# Patient Record
Sex: MALE | Race: WHITE | Employment: OTHER | URBAN - METROPOLITAN AREA
[De-identification: names, ages, dates, MRNs, and addresses within clinical notes are randomized per-mention and may not be internally consistent; named-entity substitution may affect disease eponyms.]

---

## 2022-03-23 ENCOUNTER — HOSPITAL ENCOUNTER (OUTPATIENT)
Dept: GENERAL RADIOLOGY | Age: 52
Discharge: HOME | End: 2022-03-23

## 2022-03-23 ENCOUNTER — HOSPITAL ENCOUNTER (OUTPATIENT)
Age: 52
Discharge: HOME | End: 2022-03-23

## 2022-03-23 DIAGNOSIS — M54.2 CERVICAL PAIN: ICD-10-CM

## 2022-03-23 PROCEDURE — 72050 X-RAY EXAM NECK SPINE 4/5VWS: CPT

## 2022-03-29 ENCOUNTER — HOSPITAL ENCOUNTER (OUTPATIENT)
Dept: PHYSICAL THERAPY | Age: 52
Setting detail: THERAPIES SERIES
Discharge: HOME | End: 2022-03-29
Payer: COMMERCIAL

## 2022-03-29 PROCEDURE — 97161 PT EVAL LOW COMPLEX 20 MIN: CPT

## 2022-03-29 PROCEDURE — 97110 THERAPEUTIC EXERCISES: CPT

## 2022-03-29 ASSESSMENT — PAIN DESCRIPTION - PROGRESSION: CLINICAL_PROGRESSION: GRADUALLY WORSENING

## 2022-03-29 ASSESSMENT — PAIN DESCRIPTION - ONSET: ONSET: AWAKENED FROM SLEEP

## 2022-03-29 ASSESSMENT — PAIN DESCRIPTION - ORIENTATION: ORIENTATION: LEFT

## 2022-03-29 ASSESSMENT — PAIN - FUNCTIONAL ASSESSMENT: PAIN_FUNCTIONAL_ASSESSMENT: PREVENTS OR INTERFERES WITH MANY ACTIVE NOT PASSIVE ACTIVITIES

## 2022-03-29 ASSESSMENT — PAIN SCALES - GENERAL: PAINLEVEL_OUTOF10: 4

## 2022-03-29 ASSESSMENT — PAIN DESCRIPTION - FREQUENCY: FREQUENCY: INTERMITTENT

## 2022-03-29 ASSESSMENT — PAIN DESCRIPTION - PAIN TYPE: TYPE: CHRONIC PAIN

## 2022-03-29 ASSESSMENT — PAIN DESCRIPTION - LOCATION: LOCATION: HEAD;NECK

## 2022-03-29 NOTE — PLAN OF CARE
Outpatient Physical Therapy           [place]           [] Phone: [phone]   Fax: [phone]  [facility]           [] Phone: [phone]   Fax: [phone]     To: Referring Practitioner: Dr. Maricruz Dunbar   From: Raffy Donato, PT    Patient: Michoacano Barnhart       : 1970  Diagnosis: Diagnosis: cervical pain   Treatment Diagnosis: Treatment Diagnosis: decreased cervical mobility and strength, limited by pain   Date: 3/29/2022    Physical Therapy Certification/Re-Certification Form  Dear Dr. Maricruz Dunbar,  The following patient has been evaluated for physical therapy services and for therapy to continue, insurance requires physician review of the treatment plan initially and every 90 days. Please review the attached evaluation and/or summary of the patient's plan of care, and verify that you agree therapy should continue by signing the attached document and sending it back to our office. Assessment:  Pt is 46year old male with chronic neck pain with recent worsening and onset of radicular symptoms in the left arm. 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 Pt demo deficits this date that include reduced cervical ROM (especially rotation and side bending), hypertonicity of suboccipitals, upper trap, and mid trap, decreased scapular/deep neck flexor musculature, decreased cervical and thoracic joint mobilization, (+) cervical radiculopathy, decreased sensation along the C6 nerve root. Pt will benefit with PT services with cervical/thoracic ROM, cervical/thoracic mobilization, STM/manual, deep neck flexor/scapular strengthening, cervical mechanical traction as needed, modalities as needed to return to PLOF. Pt prior to onset of current condition had min pain, but no impaired sensation with able to complete full ADLs and work activities.  Patient received education on their current pathology and how their condition effects them with their functional activities. Patient understood discussion and questions were answered. Patient understands their activity limitations and understands rational for treatment progression. Plan of Care/Treatment to date:  [x] Therapeutic Exercise  [] Modalities:  [x] Therapeutic Activity     [] Ultrasound  [] Electrical Stimulation  [] Gait Training      [] Cervical Traction [] Lumbar Traction  [x] Neuromuscular Re-education    [x] Cold/hotpack [] Iontophoresis   [x] Instruction in HEP      [] Vasopneumatic    [] Dry Needling  [x] Manual Therapy               [] Aquatic Therapy       Other:          Frequency/Duration:  # Days per week: [x] 1 day # Weeks: [] 1 week [x] 5 weeks     [x] 2 days   [] 2 weeks [] 6 weeks     [] 3 days   [] 3 weeks [] 7 weeks     [] 4 days   [] 4 weeks [] 8 weeks         [] 9 weeks [] 10 weeks         [] 11 weeks [] 12 weeks    Rehab Potential/Progress: [] Excellent [x] Good [] Fair  [] Poor     Goals:    Patient goals : improve pain and symptoms  Short term goals  Time Frame for Short term goals: 6 weeks  Short term goal 1: Pt demo I w/ HEP and symptom management  Short term goal 2: Pt demo NDI score <20/50 to improve tolerance to ADL's  Short term goal 3: Pt demo improved cervical rotation by >5 deg ea side to increase ability to look over his shoulder while driving  Short term goal 4: Pt demo scapular strength >4/5 in 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 centralize symptoms to the neck and report average neck pain <4/10         Electronically signed by:  Toby Wray, PT, DPT, CSCS 3/29/2022, 1:16 PM        If you have any questions or concerns, please don't hesitate to call.   Thank you for your referral.      Physician Signature:________________________________Date:_________ TIME: _____  By signing above, therapists plan is approved by physician

## 2022-03-29 NOTE — PROGRESS NOTES
Physical Therapy  Initial Assessment  Date: 3/29/2022  Patient Name: Eagle Barker  MRN: [id number]  : 1970     Treatment Diagnosis: decreased cervical mobility and strength, limited by pain    Subjective   General  Chart Reviewed: Yes  Patient assessed for rehabilitation services?: Yes  Referring Practitioner: Dr. Kathy Polk  Diagnosis: cervical pain  Follows Commands: Within Functional Limits  PT Visit Information  PT Insurance Information: [employer]  Subjective  Subjective: Patient reports in the  he had difficulties turning his neck to the L side and he had physical therapy intervention with ultrasound. States ever since then this area along the L upper trap has bothered him. States he is a  and holding his camera hurts. This past Yoanna the symptoms returned and continued to worsen for him; states he has a pinched nerve and now has N/T down his LUE into digits 1-3. States he is also an advanced . He has been doing a lot of stretches with some relief. States during the day and when it is moving it is not too bad, with rest it worsens. States he is tossing and turning a lot at night. Pain Screening  Patient Currently in Pain: Yes  Pain Assessment  Pain Assessment: 0-10  Pain Level: 4  Patient's Stated Pain Goal: 1  Pain Type: Chronic pain  Pain Location: Head;Neck  Pain Orientation: Left  Pain Radiating Towards: N/T down LUE into first three digits  Pain Descriptors: Aching; Shooting;Sore;Tingling;Numbness  Pain Frequency: Intermittent  Pain Onset: Awakened from sleep  Clinical Progression: Gradually worsening  Functional Pain Assessment: Prevents or interferes with many active not passive activities  Vital Signs  Patient Currently in Pain: Yes    Vision/Hearing  Vision  Vision: Within Functional Limits  Hearing  Hearing: Within functional limits    Orientation  Orientation  Overall Orientation Status: Within Normal Limits    Social/Functional History  Social/Functional History  ADL Assistance: Independent  Homemaking Assistance: Independent  Ambulation Assistance: Independent  Transfer Assistance: Independent  Active : Yes  Mode of Transportation: Car  Occupation: Full time employment  Type of occupation: self employed  and     Objective  Observation/Palpation  Posture: Fair  Palpation: Tenderness noted along L UT/mid trap, increased hypertonicity of suboccipitals/UT/mid trap, and levator  Observation: No gait deviations noted; increased forward head posture  Body Mechanics: DIffiuclty laying supine due to increase neck pain and radicular symptoms    PROM RUE (degrees)  RUE PROM: WNL  AROM RUE (degrees)  RUE AROM : WNL  PROM LUE (degrees)  LUE PROM: WNL  AROM LUE (degrees)  LUE AROM : WNL  Spine  Cervical: Cervical ROM:Flexion: 50 deg *feels a pullExtension: 15 deg R rotation:  40 deg *increased tingling L rotation: 40 deg R side bendin degL side bendin deg  Thoracic: Unable to clear ASIS bilat  Joint Mobility  Spine: Hypomobility of cervical down/lateral glides, decreased throacic P/A mobilizations into extension and rotation    Strength RUE  Strength RUE: WFL  Comment: except reduced mid trap, low trap and rhomboid (4-/5)  Strength LUE  Strength LUE: WFL  Comment: except reduced mid trap, low trap and rhomboid (4-/5)     Additional Measures  Special Tests: Cervical radiculopathy cluster: (+) on L side; reduced symptoms with L arm placed on head  Sensation  Overall Sensation Status: Impaired  Additional Comments: N/T intermittent down LUE to digits 1-3    Assessment   Conditions Requiring Skilled Therapeutic Intervention  Body structures, Functions, Activity limitations: Decreased functional mobility ; Increased pain;Decreased sensation;Decreased ADL status; Decreased posture;Decreased ROM; Decreased strength  Pt is 46year old male with chronic neck pain with recent worsening and onset of radicular symptoms in the left arm.  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 centralize symptoms to the neck and report average neck pain <4/10  Patient Goals   Patient goals : improve pain and symptoms    Sita Weaver, PT, DPT, CSCS

## 2022-03-29 NOTE — FLOWSHEET NOTE
Outpatient Physical Therapy  [place]           [x] Phone: [phone]   Fax: [phone]  [facility]           [] Phone: [phone]   Fax: [phone]        Physical Therapy Daily Treatment Note  Date:  3/29/2022    Patient Name:  Sharon Martin    :  1970  MRN: [id number]  Restrictions/Precautions: NONE  Diagnosis:   Diagnosis: cervical pain  Date of Injury/Surgery: --  Treatment Diagnosis: Treatment Diagnosis: decreased cervical mobility and strength, limited by pain    Insurance/Certification information: PT Insurance Information: Bc 20 visits per year  Referring Physician:  Referring Practitioner: Dr. 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 centralize symptoms to the neck and report average neck pain <4/10     Summary of Evaluation:  Pt is 46year old male with chronic neck pain with recent worsening and onset of radicular symptoms in the left arm. 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  Pt demo deficits this date that include reduced cervical ROM (especially rotation and side bending), hypertonicity of suboccipitals, upper trap, and mid trap, decreased scapular/deep neck flexor musculature, decreased cervical and thoracic joint mobilization, (+) cervical radiculopathy, decreased sensation along the C6 nerve root. Pt will benefit with PT services with cervical/thoracic ROM, cervical/thoracic mobilization, STM/manual, deep neck flexor/scapular strengthening, cervical mechanical traction as needed, modalities as needed to return to PLOF. Pt prior to onset of current condition had min pain, but no impaired sensation with able to complete full ADLs and work activities. Patient received education on their current pathology and how their condition effects them with their functional activities. Patient understood discussion and questions were answered. Patient understands their activity limitations and understands rational for treatment progression. Subjective:  See monica         Any changes in Ambulatory Summary Sheet? None        Objective:  See eval   COVID screening questions were asked and patient attested that there had been no contact or symptoms        Exercises: (No more than 4 columns)   Exercise/Equipment 3/29/22 #1 Date Date           WARM UP                     TABLE      *UT table stretch      *Supine chin tuck      *Prone HABD                     STANDING                                                     PROPRIOCEPTION                                    MODALITIES                      Other Therapeutic Activities/Education:  Patient received education on their current pathology and how their condition effects them with their functional activities. Patient understood discussion and questions were answered. Patient understands their activity limitations and understands rational for treatment progression. Home Exercise Program:  HO issued, reviewed and discussed with patient. Pt agreed to comply.         Manual Treatments:  --      Modalities:  --      Communication with other providers:  monica sent 3/29/22      Assessment:  (Response towards treatment session) (Pain Rating)  Pt is 46year old male with chronic neck pain with recent worsening and onset of radicular symptoms in the left arm. 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 Pt demo deficits this date that include reduced cervical ROM (especially rotation and side bending), hypertonicity of suboccipitals, upper trap, and mid trap, decreased scapular/deep neck flexor musculature, decreased cervical and thoracic joint mobilization, (+) cervical radiculopathy, decreased sensation along the C6 nerve root. Pt will benefit with PT services with cervical/thoracic ROM, cervical/thoracic mobilization, STM/manual, deep neck flexor/scapular strengthening, cervical mechanical traction as needed, modalities as needed to return to PLOF. Pt prior to onset of current condition had min pain, but no impaired sensation with able to complete full ADLs and work activities. Patient received education on their current pathology and how their condition effects them with their functional activities. Patient understood discussion and questions were answered. Patient understands their activity limitations and understands rational for treatment progression.           Plan for Next Session:  --      Time In / Time Out:    0142-8249      Timed Code/Total Treatment Minutes:  48'  (1) PT eval   (1) TE 10'      Next Progress Note due:  10th visit      Plan of Care Interventions:  [x] Therapeutic Exercise  [] Modalities:  [x] Therapeutic Activity     [] Ultrasound  [] Estim  [] Gait Training      [] Cervical Traction [] Lumbar Traction  [x] Neuromuscular Re-education    [] Cold/hotpack [] Iontophoresis   [x] Instruction in HEP      [] Vasopneumatic   [] Dry Needling    [x] Manual Therapy               [] Aquatic Therapy              Electronically signed by:  Soo Sarmiento, PT, DPT, CSCS 3/29/2022, 7:16 AM

## 2022-04-01 ENCOUNTER — HOSPITAL ENCOUNTER (OUTPATIENT)
Dept: PHYSICAL THERAPY | Age: 52
Setting detail: THERAPIES SERIES
Discharge: HOME | End: 2022-04-01
Payer: COMMERCIAL

## 2022-04-01 PROCEDURE — 97110 THERAPEUTIC EXERCISES: CPT

## 2022-04-01 PROCEDURE — 97140 MANUAL THERAPY 1/> REGIONS: CPT

## 2022-04-01 PROCEDURE — G0283 ELEC STIM OTHER THAN WOUND: HCPCS

## 2022-04-01 NOTE — FLOWSHEET NOTE
Outpatient Physical Therapy  [place]           [x] Phone: [phone]   Fax: [phone]  [facility]           [] Phone: [phone]   Fax: [phone]        Physical Therapy Daily Treatment Note  Date:  2022    Patient Name:  Aleta Jean Baptiste    :  1970  MRN: [id number]  Restrictions/Precautions: NONE  Diagnosis:   Diagnosis: cervical pain  Date of Injury/Surgery: --  Treatment Diagnosis: Treatment Diagnosis: decreased cervical mobility and strength, limited by pain    Insurance/Certification information: PT Insurance Information: [employer] 20 visits per year  Referring Physician:  Referring Practitioner: Dr. 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 centralize symptoms to the neck and report average neck pain <4/10     Summary of Evaluation:  Pt is 46year old male with chronic neck pain with recent worsening and onset of radicular symptoms in the left arm. 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  Pt demo deficits this date that include reduced cervical ROM (especially rotation and side bending), hypertonicity of suboccipitals, upper trap, and mid trap, decreased scapular/deep neck flexor musculature, decreased cervical and thoracic joint mobilization, (+) cervical radiculopathy, decreased sensation along the C6 nerve root. Pt will benefit with PT services with cervical/thoracic ROM, cervical/thoracic mobilization, STM/manual, deep neck flexor/scapular strengthening, cervical mechanical traction as needed, modalities as needed to return to PLOF. Pt prior to onset of current condition had min pain, but no impaired sensation with able to complete full ADLs and work activities. Patient received education on their current pathology and how their condition effects them with their functional activities. Patient understood discussion and questions were answered. Patient understands their activity limitations and understands rational for treatment progression. Subjective:  Pt stated that his pain was about 3-4/10 with radicular symptoms into upper shoulder. Pt stated that he has difficulty laying flat. Pt sated that he gets increased pain with shoulders back. Pt stated that he does better with his shoulders supported and with his arm OH        Any changes in Ambulatory Summary Sheet?   None        Objective:     COVID screening questions were asked and patient attested that there had been no contact or symptoms    Attempted  Manual in supine with towels under shoulders not tolerated   Attempted seated manual still not tolerable  Trial of E-stim as per George Guerrero PT  Seated      Exercises: (No more than 4 columns)   Exercise/Equipment 3/29/22 #1 4/1/2022 #2 Date           WARM UP         UBE   4' 120 backwards          TABLE      *UT table stretch  Man +pain     Man after e-stim    *Supine chin tuck  Man supine and seated  +pain    *Prone HABD      Seated hands behind head with shoulder squeezes  10x2              STANDING                                                     PROPRIOCEPTION                                    MODALITIES      IFC  W/ HP  15'   Vector 60°               Other Therapeutic Activities/Education:  Patient received education on their current pathology and how their condition effects them with their functional activities. Patient understood discussion and questions were answered. Patient understands their activity limitations and understands rational for treatment progression. Home Exercise Program:  HO issued, reviewed and discussed with patient. Pt agreed to comply. Manual Treatments:  --      Modalities:IFC as per Bailee Lunsford, PT  Seated with HP x 15' vector 60     Communication with other providers:  eval sent 3/29/22      Assessment:  (Response towards treatment session) (Pain Rating)Pt tolerated treatment fair. Prior to e-stim, patient was not able to even tolerate the manual treatment in supine or sitting. After e-stim, patient was able to tolerate more exercise and more manual. Pt rated pain at 2/10 after treatment and had a little more tissue mobility. Pt is 46year old male with chronic neck pain with recent worsening and onset of radicular symptoms in the left arm. 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 Pt demo deficits this date that include reduced cervical ROM (especially rotation and side bending), hypertonicity of suboccipitals, upper trap, and mid trap, decreased scapular/deep neck flexor musculature, decreased cervical and thoracic joint mobilization, (+) cervical radiculopathy, decreased sensation along the C6 nerve root. Pt will benefit with PT services with cervical/thoracic ROM, cervical/thoracic mobilization, STM/manual, deep neck flexor/scapular strengthening, cervical mechanical traction as needed, modalities as needed to return to PLOF. Pt prior to onset of current condition had min pain, but no impaired sensation with able to complete full ADLs and work activities. Patient received education on their current pathology and how their condition effects them with their functional activities.  Patient understood discussion and questions were answered. Patient understands their activity limitations and understands rational for treatment progression.           Plan for Next Session:  --      Time In / Time Out:  1003/1057      Timed Code/Total Treatment Minutes:  39'/54 2 man(28') 1 TE (12')  1 estim     Next Progress Note due:  10th visit      Plan of Care Interventions:  [x] Therapeutic Exercise  [] Modalities:  [x] Therapeutic Activity     [] Ultrasound  [] Estim  [] Gait Training      [] Cervical Traction [] Lumbar Traction  [x] Neuromuscular Re-education    [] Cold/hotpack [] Iontophoresis   [x] Instruction in HEP      [] Vasopneumatic   [] Dry Needling    [x] Manual Therapy               [] Aquatic Therapy              Electronically signed by: Geno Souza PTA  04/01/22 4/1/2022,2:59 PM

## 2022-04-04 ENCOUNTER — HOSPITAL ENCOUNTER (OUTPATIENT)
Dept: PHYSICAL THERAPY | Age: 52
Setting detail: THERAPIES SERIES
Discharge: HOME | End: 2022-04-04
Payer: COMMERCIAL

## 2022-04-04 PROCEDURE — 97110 THERAPEUTIC EXERCISES: CPT

## 2022-04-04 PROCEDURE — 97140 MANUAL THERAPY 1/> REGIONS: CPT

## 2022-04-04 NOTE — FLOWSHEET NOTE
Outpatient Physical Therapy  [place]           [x] Phone: [phone]   Fax: [phone]  [facility]           [] Phone: [phone]   Fax: [phone]        Physical Therapy Daily Treatment Note  Date:  2022    Patient Name:  Joseph Mckoy    :  1970  MRN: [id number]  Restrictions/Precautions: NONE  Diagnosis:   Diagnosis: cervical pain  Date of Injury/Surgery: --  Treatment Diagnosis: Treatment Diagnosis: decreased cervical mobility and strength, limited by pain    Insurance/Certification information: PT Insurance Information: [employer] 20 visits per year  Referring Physician:  Referring Practitioner: Dr. Emily Sánchez  Next Doctor Visit:  --  Plan of care signed (Y/N):  YES, sent 3/29/22  Outcome Measure: NDI:   Visit# / total visits:   3/10  Pain level: 3-4/10   Goals:     Patient goals : improve pain and symptoms  Short term goals  Time Frame for Short term goals: 6 weeks  Short term goal 1: Pt demo I w/ HEP and symptom management  Short term goal 2: Pt demo NDI score <20/50 to improve tolerance to ADL's  Short term goal 3: Pt demo improved cervical rotation by >5 deg ea side to increase ability to look over his shoulder while driving  Short term goal 4: Pt demo scapular strength >4/5 in 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 centralize symptoms to the neck and report average neck pain <4/10     Summary of Evaluation:  Pt is 46year old male with chronic neck pain with recent worsening and onset of radicular symptoms in the left arm. 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  Pt demo deficits this date that include reduced cervical ROM (especially rotation and side bending), hypertonicity of suboccipitals, upper trap, and mid trap, decreased scapular/deep neck flexor musculature, decreased cervical and thoracic joint mobilization, (+) cervical radiculopathy, decreased sensation along the C6 nerve root. Pt will benefit with PT services with cervical/thoracic ROM, cervical/thoracic mobilization, STM/manual, deep neck flexor/scapular strengthening, cervical mechanical traction as needed, modalities as needed to return to PLOF. Pt prior to onset of current condition had min pain, but no impaired sensation with able to complete full ADLs and work activities. Patient received education on their current pathology and how their condition effects them with their functional activities. Patient understood discussion and questions were answered. Patient understands their activity limitations and understands rational for treatment progression. Subjective:  Piyush Kebede reports his symptoms have been worse over the last week and he is still unable to sleep. States the exercises have been good, but only allow for temporary relief. He called his doctor to get MRI set-up. Any changes in Ambulatory Summary Sheet?   None      Objective:     COVID screening questions were asked and patient attested that there had been no contact or symptoms    Cervical side bending R: 44 deg    Exercises: (No more than 4 columns)   Exercise/Equipment 3/29/22 #1 4/1/2022 #2 4/4/22 #3           WARM UP         UBE   4' 120 backwards --         TABLE      *UT table stretch  Man +pain     Man after e-stim MAN, reviewed to ensure proper technique   *Supine chin tuck  Man supine and seated  +pain Unable to tolerate today   *Prone HABD   Standing modified 2x10   Seated hands behind head with shoulder squeezes  10x2  -   TB Row   x10 RTB                     STANDING      Pectoralis stretch   20\" x2 at 90 deg abd   Modified prone row    2x10 no weight   TRX Walk-Outs   2x10 5\"                                PROPRIOCEPTION                                    MODALITIES      IFC  W/ HP  15'   Vector 60°  --             Other Therapeutic Activities/Education:  Patient received education on their current pathology and how their condition effects them with their functional activities. Patient understood discussion and questions were answered. Patient understands their activity limitations and understands rational for treatment progression. Home Exercise Program:  HO issued, reviewed and discussed with patient. Pt agreed to comply. Manual Treatments:  , thoracic P/A mobilizations into extension, IASTM of suboccipitals, UT/levator x15'      Modalities:    Communication with other providers:  eval sent 3/29/22      Assessment:  Maria Del Carmen Hayden demonstrates fair tolerance to today's session. He has increased pain and N/T upon arrival, able to tolerate seated soft tissue except with increased tingling down the arm with pressure to levator. Unable to tolerate prone/supine today due to increase N/T. Symptoms improve with overhead positioning of arm, external rotation, and extension; worsens with closing down at the [facility] joint. Plans to have MRI completed. Told pt to hold on chin tucks due to increased N/T. Progress scapular strengthening and soft tissue mobilization within patient tolerance. End of session: 3/10      Pt is 46year old male with chronic neck pain with recent worsening and onset of radicular symptoms in the left arm. 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 Pt demo deficits this date that include reduced cervical ROM (especially rotation and side bending), hypertonicity of suboccipitals, upper trap, and mid trap, decreased scapular/deep neck flexor musculature, decreased cervical and thoracic joint mobilization, (+) cervical radiculopathy, decreased sensation along the C6 nerve root. Pt will benefit with PT services with cervical/thoracic ROM, cervical/thoracic mobilization, STM/manual, deep neck flexor/scapular strengthening, cervical mechanical traction as needed, modalities as needed to return to PLOF.  Pt prior to onset of current condition had min pain, but no impaired sensation with able to complete full ADLs and work activities. Patient received education on their current pathology and how their condition effects them with their functional activities. Patient understood discussion and questions were answered. Patient understands their activity limitations and understands rational for treatment progression.           Plan for Next Session:  --      Time In / Time Out:  1863-6839      Timed Code/Total Treatment Minutes:  36'    (1) MAN 15'   (2)  TE 25'    Next Progress Note due:  10th visit      Plan of Care Interventions:  [x] Therapeutic Exercise  [x] Modalities:  [x] Therapeutic Activity     [] Ultrasound  [x] Estim  [] Gait Training      [] Cervical Traction [] Lumbar Traction  [x] Neuromuscular Re-education    [] Cold/hotpack [] Iontophoresis   [x] Instruction in HEP      [] Vasopneumatic   [] Dry Needling    [x] Manual Therapy               [] Aquatic Therapy              Electronically signed by: Rylee Pizano, PT, DPT, CSCS  04/04/22 4/4/2022,8:43 AM

## 2022-04-08 ENCOUNTER — HOSPITAL ENCOUNTER (OUTPATIENT)
Dept: PHYSICAL THERAPY | Age: 52
Setting detail: THERAPIES SERIES
Discharge: HOME | End: 2022-04-08
Payer: COMMERCIAL

## 2022-04-08 PROCEDURE — 97110 THERAPEUTIC EXERCISES: CPT

## 2022-04-08 PROCEDURE — 97140 MANUAL THERAPY 1/> REGIONS: CPT

## 2022-04-08 NOTE — FLOWSHEET NOTE
Outpatient Physical Therapy  [place]           [x] Phone: [phone]   Fax: [phone]  [facility]           [] Phone: [phone]   Fax: [phone]        Physical Therapy Daily Treatment Note  Date:  2022    Patient Name:  Ebenezer Beckman    :  1970  MRN: [id number]  Restrictions/Precautions: NONE  Diagnosis:   Diagnosis: cervical pain  Date of Injury/Surgery: --  Treatment Diagnosis: Treatment Diagnosis: decreased cervical mobility and strength, limited by pain    Insurance/Certification information: PT Insurance Information: [employer] 20 visits per year  Referring Physician:  Referring Practitioner: Dr. 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 centralize symptoms to the neck and report average neck pain <4/10     Summary of Evaluation:  Pt is 46year old male with chronic neck pain with recent worsening and onset of radicular symptoms in the left arm. 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  Pt demo deficits this date that include reduced cervical ROM (especially rotation and side bending), hypertonicity of suboccipitals, upper trap, and mid trap, decreased scapular/deep neck flexor musculature, decreased cervical and thoracic joint mobilization, (+) cervical radiculopathy, decreased sensation along the C6 nerve root. Pt will benefit with PT services with cervical/thoracic ROM, cervical/thoracic mobilization, STM/manual, deep neck flexor/scapular strengthening, cervical mechanical traction as needed, modalities as needed to return to PLOF. Pt prior to onset of current condition had min pain, but no impaired sensation with able to complete full ADLs and work activities. Patient received education on their current pathology and how their condition effects them with their functional activities. Patient understood discussion and questions were answered. Patient understands their activity limitations and understands rational for treatment progression. Subjective:  Pamela Bey arrives to therapy stating that he is not feeling so good. Did not go to sleep until 4a. m. once the exhaustion was worse than his pain. He reports he woke up with a very stiff neck. He has an MRI scheduled for Monday the 11th. He states that he experiences increase in pain and N/T if he has to drive more than 30' at a time. Beginning session pain 4/10. During treatment pain 4/10. Any changes in Ambulatory Summary Sheet? None    Objective:     COVID screening questions were asked and patient attested that there had been no contact or symptoms  Reports of tingling during chin tucks and UT stretching.      Exercises: (No more than 4 columns)   Exercise/Equipment 3/29/22 #1 4/1/2022 #2 4/4/22 #3 4/8/22 #4            WARM UP          UBE   4' 120 backwards -- 4' 120 backwards          TABLE       *UT table stretch  Man +pain     Man after e-stim MAN, reviewed to ensure proper technique Man + pain/tingling    *Supine chin tuck  Man supine and seated  +pain Unable to tolerate today x10 seated    *Prone HABD   Standing modified 2x10 2x10 prone    Seated hands behind head with shoulder squeezes  10x2  - 2x10 3\"   TB Row   x10 RTB x10 RTB                        STANDING       Pectoralis stretch 20\" x2 at 90 deg abd    Modified prone row    2x10 no weight 2x10 no weight   TRX Walk-Outs   2x10 5\" 2x10 5\"                                    PROPRIOCEPTION                                          MODALITIES       IFC  W/ HP  15'   Vector 60°  --               Other Therapeutic Activities/Education:  Patient received education on their current pathology and how their condition effects them with their functional activities. Patient understood discussion and questions were answered. Patient understands their activity limitations and understands rational for treatment progression. Home Exercise Program:  HO issued, reviewed and discussed with patient. Pt agreed to comply. Manual Treatments:  , upper trap stretch, suboccipital release, min distraction      Modalities:    Communication with other providers:  eval sent 3/29/22      Assessment:  Preet tolerated today's session fair with no increase in pain, but did have some increase in  N/T which he says is nothing new and that he was experiencing this before tx.      End session pain 4/10    Plan for Next Session:  --      Time In / Time Out: 1123/1154      Timed Code/Total Treatment Minutes:  1 TE 21 , 1 Man 10'    Next Progress Note due:  10th visit      Plan of Care Interventions:  [x] Therapeutic Exercise  [x] Modalities:  [x] Therapeutic Activity     [] Ultrasound  [x] Estim  [] Gait Training      [] Cervical Traction [] Lumbar Traction  [x] Neuromuscular Re-education    [] Cold/hotpack [] Iontophoresis   [x] Instruction in HEP      [] Vasopneumatic   [] Dry Needling    [x] Manual Therapy               [] Aquatic Therapy              Electronically signed by: Licia Pastor, Scot Saint, PTA   04/08/22 4/8/2022,6:57 AM

## 2022-04-11 ENCOUNTER — HOSPITAL ENCOUNTER (OUTPATIENT)
Dept: PHYSICAL THERAPY | Age: 52
Setting detail: THERAPIES SERIES
Discharge: HOME | End: 2022-04-11
Payer: COMMERCIAL

## 2022-04-11 PROCEDURE — 97140 MANUAL THERAPY 1/> REGIONS: CPT

## 2022-04-11 PROCEDURE — 97110 THERAPEUTIC EXERCISES: CPT

## 2022-04-11 NOTE — FLOWSHEET NOTE
Outpatient Physical Therapy  [place]           [x] Phone: [phone]   Fax: [phone]  [facility]           [] Phone: [phone]   Fax: [phone]        Physical Therapy Daily Treatment Note  Date:  2022    Patient Name:  Edgardo López    :  1970  MRN: [id number]  Restrictions/Precautions: NONE  Diagnosis:   Diagnosis: cervical pain  Date of Injury/Surgery: --  Treatment Diagnosis: Treatment Diagnosis: decreased cervical mobility and strength, limited by pain    Insurance/Certification information: PT Insurance Information: [employer] 20 visits per year  Referring Physician:  Referring Practitioner: Dr. 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 centralize symptoms to the neck and report average neck pain <4/10     Summary of Evaluation:  Pt is 46year old male with chronic neck pain with recent worsening and onset of radicular symptoms in the left arm. 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  Pt demo deficits this date that include reduced cervical ROM (especially rotation and side bending), hypertonicity of suboccipitals, upper trap, and mid trap, decreased scapular/deep neck flexor musculature, decreased cervical and thoracic joint mobilization, (+) cervical radiculopathy, decreased sensation along the C6 nerve root. Pt will benefit with PT services with cervical/thoracic ROM, cervical/thoracic mobilization, STM/manual, deep neck flexor/scapular strengthening, cervical mechanical traction as needed, modalities as needed to return to PLOF. Pt prior to onset of current condition had min pain, but no impaired sensation with able to complete full ADLs and work activities. Patient received education on their current pathology and how their condition effects them with their functional activities. Patient understood discussion and questions were answered. Patient understands their activity limitations and understands rational for treatment progression. Subjective:  Michelle Barahona arrives to therapy reporting 4/10     Any changes in Ambulatory Summary Sheet? None    Objective:     COVID screening questions were asked and patient attested that there had been no contact or symptoms    Reports of tingling during chin tucks and UT stretching.      Exercises: (No more than 4 columns)   Exercise/Equipment 4/4/22 #3 4/8/22 #4 4/11/22 #5           WARM UP         UBE  -- 4' 120 backwards 4'         TABLE      *UT table stretch MAN, reviewed to ensure proper technique Man + pain/tingling     *Supine chin tuck Unable to tolerate today x10 seated     *Prone HABD Standing modified 2x10 2x10 prone     Seated hands behind head with shoulder squeezes - 2x10 3\" 2x10 3\"   TB Row x10 RTB x10 RTB 2x10 RTB                     STANDING      Pectoralis stretch 20\" x2 at 90 deg abd  30\" x2 at 90 deg   Modified prone row  2x10 no weight 2x10 no weight 2x10 no weight   TRX Walk-Outs 2x10 5\" 2x10 5\" 2x10 5-10\"   Wall Dog   5\" x4 throughout session                          PROPRIOCEPTION                                    MODALITIES      IFC  W/ HP --               Other Therapeutic Activities/Education:  Patient received education on their current pathology and how their condition effects them with their functional activities. Patient understood discussion and questions were answered. Patient understands their activity limitations and understands rational for treatment progression. Home Exercise Program:  HO issued, reviewed and discussed with patient. Pt agreed to comply. Manual Treatments:  , upper trap stretch, suboccipital release, min distractionIASTM of suboccipitals, UT/levator x15'      Modalities:    Communication with other providers:  eval sent 3/29/22      Assessment:  Preet tolerated today's session fair with no increase in pain. Continued with IASTM since he was able to get more tissue mobility following. He is able to toelrate all his new exercises from previous sessions, but still has difficulties lying prone/supine.       End session pain 4/10    Plan for Next Session:  --      Time In / Time Out: 0716-8936      Timed Code/Total Treatment Minutes:  2 TE 35, 1 Man 10'    Next Progress Note due:  10th visit      Plan of Care Interventions:  [x] Therapeutic Exercise  [x] Modalities:  [x] Therapeutic Activity     [] Ultrasound  [x] Estim  [] Gait Training      [] Cervical Traction [] Lumbar Traction  [x] Neuromuscular Re-education    [] Cold/hotpack [] Iontophoresis   [x] Instruction in HEP      [] Vasopneumatic   [] Dry Needling    [x] Manual Therapy               [] Aquatic Therapy              Electronically signed by: Annette Ballard, PT, DPT, CSCS  04/11/22 4/11/2022,8:39 AM

## 2022-04-14 ENCOUNTER — HOSPITAL ENCOUNTER (OUTPATIENT)
Dept: PHYSICAL THERAPY | Age: 52
Setting detail: THERAPIES SERIES
Discharge: HOME | End: 2022-04-14
Payer: COMMERCIAL

## 2022-04-14 PROCEDURE — 97012 MECHANICAL TRACTION THERAPY: CPT

## 2022-04-14 PROCEDURE — 97140 MANUAL THERAPY 1/> REGIONS: CPT

## 2022-04-14 NOTE — FLOWSHEET NOTE
(+) cervical radiculopathy, decreased sensation along the C6 nerve root. Pt will benefit with PT services with cervical/thoracic ROM, cervical/thoracic mobilization, STM/manual, deep neck flexor/scapular strengthening, cervical mechanical traction as needed, modalities as needed to return to PLOF. Pt prior to onset of current condition had min pain, but no impaired sensation with able to complete full ADLs and work activities. Patient received education on their current pathology and how their condition effects them with their functional activities. Patient understood discussion and questions were answered. Patient understands their activity limitations and understands rational for treatment progression. Subjective: Tyrell Ortez arrives to therapy stating that he does not feel great today. Pt reports pain is significant today with tingling down the arms. MRI revealed protruding disc between C5, C6. Pain beginning session is 6/10  Pain during traction 3/10    Any changes in Ambulatory Summary Sheet?      Objective:     COVID screening questions were asked and patient attested that there had been no contact or symptoms  responded well to transverse mobs    Exercises: (No more than 4 columns)   Exercise/Equipment 4/8/22 #4 4/11/22 #5 4/14/22 #6           WARM UP         UBE  4' 120 backwards 4' 4' 120         TABLE      *UT table stretch Man + pain/tingling      *Supine chin tuck x10 seated      *Prone HABD 2x10 prone      Seated hands behind head with shoulder squeezes 2x10 3\" 2x10 3\"    TB Row x10 RTB 2x10 RTB                      STANDING      Pectoralis stretch  30\" x2 at 90 deg    Modified prone row  2x10 no weight 2x10 no weight    TRX Walk-Outs 2x10 5\" 2x10 5-10\"    Wall Dog  5\" x4 throughout session                           PROPRIOCEPTION                                    MODALITIES      IFC  W/ HP                Other Therapeutic Activities/Education:  Patient received education on their current pathology and how their condition effects them with their functional activities. Patient understood discussion and questions were answered. Patient understands their activity limitations and understands rational for treatment progression. Home Exercise Program:  HO issued, reviewed and discussed with patient. Pt agreed to comply. Manual Treatments: manual cervical traction, thoracic PA mobs, rotational mobs     Modalities:  Mechanical cervical traction 20# max pull10# min pull for 10'      Communication with other providers:  eval sent 3/29/22      Assessment:  Preet tolerated today's session well. He responded well to manual and mechanical traction with elimination of tingling sensation and reported decrease in pain.      End session pain 3/10        Plan for Next Session:  --      Time In / Time Out: 1118/1200      Timed Code/Total Treatment Minutes: 43'  2 MAN 27', 1 mechanical traction 15'    Next Progress Note due:  10th visit      Plan of Care Interventions:  [x] Therapeutic Exercise  [x] Modalities:  [x] Therapeutic Activity     [] Ultrasound  [x] Estim  [] Gait Training      [] Cervical Traction [] Lumbar Traction  [x] Neuromuscular Re-education    [] Cold/hotpack [] Iontophoresis   [x] Instruction in HEP      [] Vasopneumatic   [] Dry Needling    [x] Manual Therapy               [] Aquatic Therapy              Electronically signed by: Henrique Bowling, PTA     4/14/2022,9:19 AM

## 2022-04-18 ENCOUNTER — HOSPITAL ENCOUNTER (OUTPATIENT)
Dept: PHYSICAL THERAPY | Age: 52
Setting detail: THERAPIES SERIES
Discharge: HOME | End: 2022-04-18
Payer: COMMERCIAL

## 2022-04-18 PROCEDURE — 97110 THERAPEUTIC EXERCISES: CPT

## 2022-04-18 PROCEDURE — 97012 MECHANICAL TRACTION THERAPY: CPT

## 2022-04-18 NOTE — FLOWSHEET NOTE
Outpatient Physical Therapy  [place]           [x] Phone: [phone]   Fax: [phone]  [facility]           [] Phone: [phone]   Fax: [phone]        Physical Therapy Daily Treatment Note  Date:  2022    Patient Name:  Andres Nascimento    :  1970  MRN: [id number]  Restrictions/Precautions: NONE  Diagnosis:   Diagnosis: cervical pain  Date of Injury/Surgery: --  Treatment Diagnosis: Treatment Diagnosis: decreased cervical mobility and strength, limited by pain    Insurance/Certification information: PT Insurance Information: [employer] 20 visits per year  Referring Physician:  Referring Practitioner: Dr. Yifan Conn  Next Doctor Visit:  --  Plan of care signed (Y/N):  YES, sent 3/29/22  Outcome Measure: NDI:   Visit# / total visits:   7/10  Pain level: 3-4/10   Goals:     Patient goals : improve pain and symptoms  Short term goals  Time Frame for Short term goals: 6 weeks  Short term goal 1: Pt demo I w/ HEP and symptom management reports compliance  Short term goal 2: Pt demo NDI score <20/50 to improve tolerance to ADL's  Short term goal 3: Pt demo improved cervical rotation by >5 deg ea side to increase ability to look over his shoulder while driving  Short term goal 4: Pt demo scapular strength >4/5 in 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 centralize symptoms to the neck and report average neck pain <4/10     Summary of Evaluation:  Pt is 46year old male with chronic neck pain with recent worsening and onset of radicular symptoms in the left arm. 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  Pt demo deficits this date that include reduced cervical ROM (especially rotation and side bending), hypertonicity of suboccipitals, upper trap, and mid trap, decreased scapular/deep neck flexor musculature, decreased cervical and thoracic joint mobilization, (+) cervical radiculopathy, decreased sensation along the C6 nerve root. Pt will benefit with PT services with cervical/thoracic ROM, cervical/thoracic mobilization, STM/manual, deep neck flexor/scapular strengthening, cervical mechanical traction as needed, modalities as needed to return to PLOF. Pt prior to onset of current condition had min pain, but no impaired sensation with able to complete full ADLs and work activities. Patient received education on their current pathology and how their condition effects them with their functional activities. Patient understood discussion and questions were answered. Patient understands their activity limitations and understands rational for treatment progression. Subjective: Maria Del Carmen Hayden arrives to therapy reporting 6/10 in the neck and tingling down his arm to his thumb. Has not spoken to the doctor yet regarding his MRI results. May 6th neurosurgeon appointment in [place]. States his sleep continues to be awful. Any changes in Ambulatory Summary Sheet?      Objective:   COVID screening questions were asked and patient attested that there had been no contact or symptoms      Exercises: (No more than 4 columns)   Exercise/Equipment 4/8/22 #4 4/11/22 #5 4/14/22 #6 4/18/22 #7            WARM UP          UBE  4' 120 backwards 4' 4' 120 4' 120          TABLE       *UT table stretch Man + pain/tingling       *Supine chin tuck x10 seated       *Prone HABD 2x10 prone       Seated hands behind head with shoulder squeezes 2x10 3\" 2x10 3\"     TB Row x10 RTB 2x10 RTB                          STANDING       Pectoralis stretch  30\" x2 at 90 deg  30\" x2   Modified prone row  2x10 no weight 2x10 no weight  x10   TRX Walk-Outs 2x10 5\" 2x10 5-10\"  2x10   Wall Dog  5\" x4 throughout session  x10 5\"                              PROPRIOCEPTION                                          MODALITIES       IFC  W/ HP                  Other Therapeutic Activities/Education: Patient received education on their current pathology and how their condition effects them with their functional activities. Patient understood discussion and questions were answered. Patient understands their activity limitations and understands rational for treatment progression. Home Exercise Program:  HO issued, reviewed and discussed with patient. Pt agreed to comply. Manual Treatments:     Modalities:  Mechanical cervical traction 20# max pull10# min pull for 15'      Communication with other providers:  dianaal sent 3/29/22      Assessment:  Preet tolerated today's session fair. He continues to have increased pain and radicular pain. He continues to get good relief with traction from his radicular symptoms, but symptoms do not relieve for long periods of time.      End session pain 3/10        Plan for Next Session:  --      Time In / Time Out: 0617-3735      Timed Code/Total Treatment Minutes:   39'   (2)  TE  25'  (1) traction 15' + 5' set-up    Next Progress Note due:  10th visit      Plan of Care Interventions:  [x] Therapeutic Exercise  [x] Modalities:  [x] Therapeutic Activity     [] Ultrasound  [x] Estim  [] Gait Training      [] Cervical Traction [] Lumbar Traction  [x] Neuromuscular Re-education    [] Cold/hotpack [] Iontophoresis   [x] Instruction in HEP      [] Vasopneumatic   [] Dry Needling    [x] Manual Therapy               [] Aquatic Therapy              Electronically signed by: Monica Gilliland, PT, DPT, CSCS      4/18/2022,8:37 AM

## 2022-05-09 ENCOUNTER — HOSPITAL ENCOUNTER (OUTPATIENT)
Dept: PHYSICAL THERAPY | Age: 52
Setting detail: THERAPIES SERIES
Discharge: HOME | End: 2022-05-09
Payer: COMMERCIAL

## 2022-05-09 PROCEDURE — 97110 THERAPEUTIC EXERCISES: CPT

## 2022-05-09 NOTE — FLOWSHEET NOTE
Outpatient Physical Therapy  [place]           [x] Phone: [phone]   Fax: [phone]  [facility]           [] Phone: [phone]   Fax: [phone]        Physical Therapy Daily Treatment Note  Date:  2022    Patient Name:  Anne Hemphill    :  1970  MRN: [id number]  Restrictions/Precautions: NONE  Diagnosis:   Diagnosis: cervical pain  Date of Injury/Surgery: --  Treatment Diagnosis: Treatment Diagnosis: decreased cervical mobility and strength, limited by pain    Insurance/Certification information: PT Insurance Information: [employer] 20 visits per year  Referring Physician:  Referring Practitioner: Dr. Fatimah Vargas  Next Doctor Visit:  --  Plan of care signed (Y/N):  YES, sent 3/29/22  Outcome Measure: NDI:   Visit# / total visits:   8/10  Pain level: 2/10   Goals:     Patient goals : improve pain and symptoms  Short term goals  Time Frame for Short term goals: 6 weeks  Short term goal 1: Pt demo I w/ HEP and symptom management reports compliance  Short term goal 2: Pt demo NDI score <20/50 to improve tolerance to ADL's  Short term goal 3: Pt demo improved cervical rotation by >5 deg ea side to increase ability to look over his shoulder while driving  Short term goal 4: Pt demo scapular strength >4/5 in 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 centralize symptoms to the neck and report average neck pain <4/10     Summary of Evaluation:  Pt is 46year old male with chronic neck pain with recent worsening and onset of radicular symptoms in the left arm. 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  Pt demo deficits this date that include reduced cervical ROM (especially rotation and side bending), hypertonicity of suboccipitals, upper trap, and mid trap, decreased scapular/deep neck flexor musculature, decreased cervical and thoracic joint mobilization, (+) cervical radiculopathy, decreased sensation along the C6 nerve root. Pt will benefit with PT services with cervical/thoracic ROM, cervical/thoracic mobilization, STM/manual, deep neck flexor/scapular strengthening, cervical mechanical traction as needed, modalities as needed to return to PLOF. Pt prior to onset of current condition had min pain, but no impaired sensation with able to complete full ADLs and work activities. Patient received education on their current pathology and how their condition effects them with their functional activities. Patient understood discussion and questions were answered. Patient understands their activity limitations and understands rational for treatment progression. Subjective: [facility] reports he saw the neurosurgeon who says there is 3-4 mm disc protrusion at C5/6. Surgeon told him to wait if he can approx. 2-3 months if no improvement. Will try some pain medication and he signed up for a pain management clinic. States the pain is significantly better, nerve pain is always there but tolerable. Rates 2/10 down to his elbow. He got a couple of self cervical traction with some relief. Any changes in Ambulatory Summary Sheet?      Objective:   COVID screening questions were asked and patient attested that there had been no contact or symptoms      Exercises: (No more than 4 columns)   Exercise/Equipment 4/14/22 #6 4/18/22 #7 5/9/22 #8           WARM UP         UBE  4' 120 4' 120 4' 120         TABLE      *UT table stretch      *Supine chin tuck      *Prone HABD/Ext   2x10 2#   Seated hands behind head with shoulder squeezes      TB Row   2x10 RTB         STANDING      Pectoralis stretch  30\" x2 30\" x2 at doorway   Modified prone row   x10 --   TRX Walk-Outs  2x10    Wall Dog  x10 5\"     Bent Over Row   2x10 5#   TB Shoulder Ext   2x10 RTB   FM SA Row    2x10 10#   PROPRIOCEPTION                                    MODALITIES      IFC  W/ HP   -- Other Therapeutic Activities/Education:  Patient received education on their current pathology and how their condition effects them with their functional activities. Patient understood discussion and questions were answered. Patient understands their activity limitations and understands rational for treatment progression. Home Exercise Program:  HO issued, reviewed and discussed with patient. Pt agreed to comply. Manual Treatments:     Modalities:  Mechanical cervical traction 20# max pull10# min pull for 15'      Communication with other providers:  eval sent 3/29/22      Assessment:  Preet tolerated today's session fair. He has bene doing some self traction devices at home, focused on strengthening exercises. He has improved tolerance with activity. Will be going on a business trip for the next week and will follow-up afterwards.      End session pain 3/10        Plan for Next Session:  --      Time In / Time Out: 9806-6165      Timed Code/Total Treatment Minutes:   36'   (3)  TE      Next Progress Note due:  10th visit      Plan of Care Interventions:  [x] Therapeutic Exercise  [x] Modalities:  [x] Therapeutic Activity     [] Ultrasound  [x] Estim  [] Gait Training      [] Cervical Traction [] Lumbar Traction  [x] Neuromuscular Re-education    [] Cold/hotpack [] Iontophoresis   [x] Instruction in HEP      [] Vasopneumatic   [] Dry Needling    [x] Manual Therapy               [] Aquatic Therapy              Electronically signed by: Patricia Haley, PT, DPT, CSCS      5/9/2022,8:29 AM

## 2022-07-12 NOTE — DISCHARGE SUMMARY
Outpatient Physical Therapy           [place]           [] Phone: [phone]   Fax: [phone]  [facility]           [] Phone: [phone]   Fax: [phone]     To: Elisha Seay MD     From: Steven Andrade PT, PT     Patient: Ebenezer Beckman       : 1970  Diagnosis: Cervicalgia [M54.2]  Radiculopathy, cervicothoracic region [M54.13]    Treatment Diagnosis:    Date: 2022    Physical Therapy Discharge Form  Dear Dr. Rosa See,     The following patient has not returned to physical therapy in > 30 days since the start of therapy and will be formally discharged at this time. If you have any questions or concerns please contact our office. Electronically signed by:  Steven Andrade PT, DPT, SIN 2022, 2:04 PM        If you have any questions or concerns, please don't hesitate to call.   Thank you for your referral.      Physician Signature:________________________________Date:_________ TIME: _____  By signing above, therapists plan is approved by physician

## 2022-07-14 NOTE — FLOWSHEET NOTE
Patients Plan of Care was received and signed. Signed POC was scanned and placed in the patients chart.     Tim Ortiz